# Patient Record
(demographics unavailable — no encounter records)

---

## 2025-06-23 NOTE — HISTORY OF PRESENT ILLNESS
[de-identified] : CC: nasal obstruction    HISTORY OF PRESENT ILLNESS: Stephen Grullon is a 50 year old male who presents today with nasal obstruction x 5 years five years ago he walked into a pole and think he broke his nose. did not have initial bleeding or bruising, but since then he has difficulty breathing out of the L side this is his first time seeking treatment. no recent CT scan he has seasonal allergies - takes a nasal spray, but does not recall the name. occasional snoring denies change in sense of smell, facial/sinus pressure, rhinorrhea, PND  he is interested in surgery   REVIEW OF SYSTEMS: General ROS: negative for - chills, fatigue or fever Psychological ROS: negative for - anxiety or depression Ophthalmic ROS: negative for - blurry vision, decreased vision or double vision ENT ROS: negative except as noted from HPI Allergy and Immunology ROS: negative except as noted from HPI Hematological and Lymphatic ROS: negative for - bleeding problems Endocrine ROS: negative for - malaise/lethargy Respiratory ROS: negative for - stridor Cardiovascular ROS: negative for - chest pain Gastrointestinal ROS: negative for - appetite loss or nausea/vomiting Genitourinary ROS: negative for - incontinence Musculoskeletal ROS: negative for - gait disturbance Neurological ROS: negative for - behavioral changes Dermatological ROS: negative for - nail changes   Physical Exam:   GENERAL APPEARANCE: Well-developed and No Acute Distress. COMMUNICATION: Able to Communicate. Strong Voice.   HEAD AND FACE Eyes: Testing of ocular motility including primary gaze alignment normal. Inspection and Appearance: No evidence of lesions or masses Palpation: Palpation of the face reveals no sinus tenderness Salivary Glands: Symmetric without masses Facial Strength: Symmetric without evidence of facial paralysis   EAR, NOSE, MOUTH, and THROAT: Ear Canals and Tympanic Membranes, Bilateral: No evidence of inflammation or lesions. Thresholds: Clinical speech reception thresholds normal. External, Nose and Auricle: No lesions or masses. Nasal, Mucosa, Septum, and Turbinates: See endoscopy.   NECK: Evaluation: No evidence of masses or crepitus. The neck is symmetric and the trachea is in the midline position. Thyroid: No evidence of enlargement, tenderness or mass. Neck Lymph Nodes: WNL. Respiratory: Inspection of the chest including symmetry, expansion and/or assessment of respiratory effort normal. Cardiovascular: Evaluation of peripheral vascular system by observation and palpation of capillary refill, normal. Neurological/Psychiatric: Alert, Oriented, Mood, and Affect Normal.   PROCEDURE: Nasal endoscopy (42644)   SURGEON: Flash Lama MD   Prior to the procedure, I had a discussion with the patient regarding the risks, benefits, and alternatives of the procedure and a verbal consent was obtained.   After obtaining adequate decongestion of the nasal mucosa with topical Epinephrine and adequate anesthesia with topical Lidocaine nasal spray, the nasal endoscope was used to examine the nasal passages and paranasal sinuses. The endoscope was passed along the floor of the nose bilaterally to evaluate the inferior meatus, floor of the nose, inferior turbinate, and nasopharynx. The scope was then passed superiorly to evaluate the area of the sphenoethmoidal recess, superior turbinate and superior meatus. As the scope was withdrawn anteriorly, the middle turbinate and middle meatus were carefully inspected. The endoscope was withdrawn and the patient tolerated the procedure well. No complications were encountered.   INSTRUMENTS: flex   EXAM FINDINGS: DNS to R, no middle meatal swelling, polyps or mucopurulence  IMPRESSION:  Stephen Grullon is a 50 year old male who presents today with DNS s/p nasal injury 5 years ago, nasal obstruction   PLAN:  - will refer to Dr. Ye for SRP       Flash Lama MD [facility] Rhinology and Skull Base Surgery Department of Otolaryngology- Head and Neck Surgery [facility]

## 2025-06-23 NOTE — HISTORY OF PRESENT ILLNESS
[de-identified] : CC: nasal obstruction    HISTORY OF PRESENT ILLNESS: Stephen Grullon is a 50 year old male who presents today with nasal obstruction x 5 years five years ago he walked into a pole and think he broke his nose. did not have initial bleeding or bruising, but since then he has difficulty breathing out of the L side this is his first time seeking treatment. no recent CT scan he has seasonal allergies - takes a nasal spray, but does not recall the name. occasional snoring denies change in sense of smell, facial/sinus pressure, rhinorrhea, PND  he is interested in surgery   REVIEW OF SYSTEMS: General ROS: negative for - chills, fatigue or fever Psychological ROS: negative for - anxiety or depression Ophthalmic ROS: negative for - blurry vision, decreased vision or double vision ENT ROS: negative except as noted from HPI Allergy and Immunology ROS: negative except as noted from HPI Hematological and Lymphatic ROS: negative for - bleeding problems Endocrine ROS: negative for - malaise/lethargy Respiratory ROS: negative for - stridor Cardiovascular ROS: negative for - chest pain Gastrointestinal ROS: negative for - appetite loss or nausea/vomiting Genitourinary ROS: negative for - incontinence Musculoskeletal ROS: negative for - gait disturbance Neurological ROS: negative for - behavioral changes Dermatological ROS: negative for - nail changes   Physical Exam:   GENERAL APPEARANCE: Well-developed and No Acute Distress. COMMUNICATION: Able to Communicate. Strong Voice.   HEAD AND FACE Eyes: Testing of ocular motility including primary gaze alignment normal. Inspection and Appearance: No evidence of lesions or masses Palpation: Palpation of the face reveals no sinus tenderness Salivary Glands: Symmetric without masses Facial Strength: Symmetric without evidence of facial paralysis   EAR, NOSE, MOUTH, and THROAT: Ear Canals and Tympanic Membranes, Bilateral: No evidence of inflammation or lesions. Thresholds: Clinical speech reception thresholds normal. External, Nose and Auricle: No lesions or masses. Nasal, Mucosa, Septum, and Turbinates: See endoscopy.   NECK: Evaluation: No evidence of masses or crepitus. The neck is symmetric and the trachea is in the midline position. Thyroid: No evidence of enlargement, tenderness or mass. Neck Lymph Nodes: WNL. Respiratory: Inspection of the chest including symmetry, expansion and/or assessment of respiratory effort normal. Cardiovascular: Evaluation of peripheral vascular system by observation and palpation of capillary refill, normal. Neurological/Psychiatric: Alert, Oriented, Mood, and Affect Normal.   PROCEDURE: Nasal endoscopy (66914)   SURGEON: Flash Lama MD   Prior to the procedure, I had a discussion with the patient regarding the risks, benefits, and alternatives of the procedure and a verbal consent was obtained.   After obtaining adequate decongestion of the nasal mucosa with topical Epinephrine and adequate anesthesia with topical Lidocaine nasal spray, the nasal endoscope was used to examine the nasal passages and paranasal sinuses. The endoscope was passed along the floor of the nose bilaterally to evaluate the inferior meatus, floor of the nose, inferior turbinate, and nasopharynx. The scope was then passed superiorly to evaluate the area of the sphenoethmoidal recess, superior turbinate and superior meatus. As the scope was withdrawn anteriorly, the middle turbinate and middle meatus were carefully inspected. The endoscope was withdrawn and the patient tolerated the procedure well. No complications were encountered.   INSTRUMENTS: flex   EXAM FINDINGS: DNS to R, no middle meatal swelling, polyps or mucopurulence  IMPRESSION:  Stephen Grullon is a 50 year old male who presents today with DNS s/p nasal injury 5 years ago, nasal obstruction   PLAN:  - will refer to Dr. Ye for SRP       Flash Lama MD [facility] Rhinology and Skull Base Surgery Department of Otolaryngology- Head and Neck Surgery [facility], [place]

## 2025-06-30 NOTE — PHYSICAL EXAM
[TextEntry] : GEN: well nourished, well developed, no acute distress. HEAD: normocephalic, atraumatic FACE: symmetric and motion intact, Howard 2  EYES: EOMI, pupils symmetric, normal conjunctiva, vision grossly intact EARS: bilateral auricles normal, TMs intact & well-aerated, hearing grossly intact EXT NOSE:  moderately thick skin, severe rightward lower third cartilagenous dorsal deviation w/ overall symmetric nasal bones, moderate bony-cartilagenous dorsal hump, tip ptosis with tip deviated to the right INT NOSE: Mucosa healthy, right septum deviation, collapsed bilateral L>R internal nasal valve w/ significant improvement on Ofelia maneuver, moderate turbinate hypertrophy SKIN: intact, warm, and dry NEURO: alert & oriented x4

## 2025-06-30 NOTE — HISTORY OF PRESENT ILLNESS
[de-identified] : Mr. Stephen Grullon is a pleasant 50 year male presenting today as referral from Dr Lama for nasal obstruction.   Pt reports h/o long-standing nasal obstruction, L>R. Symptoms have been present for their whole life. Pt sustained injury to the nose about 5 years ago when he ran into a pole and symptoms worsened; did not obain medical care at that point. They have tried nasal steroid sprays in the past for 4 consecutive weeks with temporary improvement. They have not tried nasal dilator strips/nasal cones. They deny h/o allergies. Denies h/o sinus infections. Endorses h/o nasal trauma as above. Denies h/o nasal surgery. Pt was evaluated by Dr Lama and nasal endoscopy was completed at that time revealing septal deviation. Aesthetically, he is bothered by the nasal dorsum deviation.   Past medical/surgical history unremarkable. Nonsmoker. Not on anticoagulation. Here by himself.

## 2025-06-30 NOTE — ASSESSMENT
[FreeTextEntry1] : .  Plan: - Patient has significant nasal obstruction refractory to maximal medical therapy and structural anatomic abnormalities that would benefit from nasal airway surgery. This would comprise septorhinoplasty via open approach with structural cartilage grafting (caudal septal reconstruction, bilateral  grafts) bilateral turbinate reduction. Will plan to address dorsal deviation. - Discussed in detail the benefits, alternatives, and risks of this procedure which include, but are not limited to, infection, bleeding, scarring, change in appearance, septal perforation, continued nasal obstruction, and need for revision surgery. - The patient reports understanding of these risks, the proposed benefits, and alternatives and wishes to proceed.  We will initiate the authorization/scheduling process.  -- Dena Ye MD Facial Plastic & Reconstructive Surgery